# Patient Record
Sex: MALE | Race: WHITE | URBAN - METROPOLITAN AREA
[De-identification: names, ages, dates, MRNs, and addresses within clinical notes are randomized per-mention and may not be internally consistent; named-entity substitution may affect disease eponyms.]

---

## 2017-07-10 ENCOUNTER — THERAPY VISIT (OUTPATIENT)
Dept: PHYSICAL THERAPY | Facility: CLINIC | Age: 57
End: 2017-07-10
Payer: COMMERCIAL

## 2017-07-10 DIAGNOSIS — G89.29 CHRONIC BILATERAL LOW BACK PAIN WITH LEFT-SIDED SCIATICA: Primary | Chronic | ICD-10-CM

## 2017-07-10 DIAGNOSIS — M54.42 CHRONIC BILATERAL LOW BACK PAIN WITH LEFT-SIDED SCIATICA: Primary | Chronic | ICD-10-CM

## 2017-07-10 DIAGNOSIS — M53.3 SI (SACROILIAC) JOINT DYSFUNCTION: ICD-10-CM

## 2017-07-10 PROCEDURE — 97112 NEUROMUSCULAR REEDUCATION: CPT | Mod: GP | Performed by: PHYSICAL THERAPIST

## 2017-07-10 PROCEDURE — 97161 PT EVAL LOW COMPLEX 20 MIN: CPT | Mod: GP | Performed by: PHYSICAL THERAPIST

## 2017-07-10 PROCEDURE — 97140 MANUAL THERAPY 1/> REGIONS: CPT | Mod: GP | Performed by: PHYSICAL THERAPIST

## 2017-07-10 NOTE — MR AVS SNAPSHOT
"              After Visit Summary   7/10/2017    Jim Lopez    MRN: [id number]           Patient Information     Date Of Birth          1960        Visit Information        Provider Department      7/10/2017 5:50 PM Tyler Wilkinson PT [facility]        Today's Diagnoses     Chronic bilateral low back pain with left-sided sciatica    -  1    SI (sacroiliac) joint dysfunction           Follow-ups after your visit        Who to contact     If you have questions or need follow up information about today's clinic visit or your schedule please contact [facility] [employer] [facility] directly at [phone].  Normal or non-critical lab and imaging results will be communicated to you by [employer], letter or phone within 4 business days after the clinic has received the results. If you do not hear from us within 7 days, please contact the clinic through [employer] or phone. If you have a critical or abnormal lab result, we will notify you by phone as soon as possible.  Submit refill requests through [employer] or call your pharmacy and they will forward the refill request to us. Please allow 3 business days for your refill to be completed.          Additional Information About Your Visit        MyChart Information     [employer] lets you send messages to your doctor, view your test results, renew your prescriptions, schedule appointments and more. To sign up, go to [email or web address] . Click on \"Log in\" on the left side of the screen, which will take you to the Welcome page. Then click on \"Sign up Now\" on the right side of the page.     You will be asked to enter the access code listed below, as well as some personal information. Please follow the directions to create your username and password.     Your access code is: [email or web address]  Expires: 10/8/2017  6:28 PM     Your access code will  in 90 days. If you need help or a new code, please " call your [facility] clinic or [phone].        Care EveryWhere ID     This is your Care EveryWhere ID. This could be used by other organizations to access your [facility] medical records  [id number]         Blood Pressure from Last 3 Encounters:   No data found for BP    Weight from Last 3 Encounters:   No data found for Wt              We Performed the Following     HC PT EVAL, LOW COMPLEXITY     BRIANA INITIAL EVAL REPORT     MANUAL THER TECH,1+REGIONS,EA 15 MIN     NEUROMUSCULAR RE-EDUCATION        Primary Care Provider    None       No address on file        Equal Access to Services     [employer] : Hadii aad ku hadasho Soomaali, waaxda luqadaha, qaybta kaalmada adeegyada, waxay idiin hayaan adeeg dylan rutledge . So [facility] [phone].    ATENCIÓN: Si habla español, tiene a hernandez disposición servicios gratuitos de asistencia lingüística. Llame al [phone].    We comply with applicable federal civil rights laws and [place] laws. We do not discriminate on the basis of race, color, national origin, age, disability sex, sexual orientation or gender identity.            Thank you!     Thank you for choosing [facility]  for your care. Our goal is always to provide you with excellent care. Hearing back from our patients is one way we can continue to improve our services. Please take a few minutes to complete the written survey that you may receive in the mail after your visit with us. Thank you!             Your Updated Medication List - Protect others around you: Learn how to safely use, store and throw away your medicines at www.disposemymeds.org.      Notice  As of 7/10/2017  6:28 PM    You have not been prescribed any medications.

## 2017-07-10 NOTE — LETTER
[facility] PHYSICAL THERAPY  [address]  [facility] [id number] [phone]    2017    Re: Jim Lopez   :   1960  MRN:  [id number]   REFERRING PHYSICIAN:   Juan Francisco Branham    [facility]IC [facility]  Date of Initial Evaluation:  7/10/17  Visits:  Rxs Used: 1  Reason for Referral:     Chronic bilateral low back pain with left-sided sciatica  SI (sacroiliac) joint dysfunction    EVALUATION SUMMARY    [facility] Initial Evaluation    Subjective:    Patient is a 57 year old male presenting with rehab back hpi. The history is provided by the patient. No  was used.   Jim Lopez is a 57 year old male with a lumbar condition.  Condition occurred with:  Insidious onset.  Condition occurred: for unknown reasons.  This is a chronic and recurrent condition  Pt presents to PT with primary complain of low back pain, chronic in nature, has had issues on and off for the past 20 years. States he usually can control the pain with exercises. This time the pain will not go away. He gets pain down the LLE laterally to the foot, tingling and occasionally numbness. He now is also getting some pain in the L iliac crest and the SI joint. The current flare up has been preset for one annita in duration, unknown cause. Pt presents with referral dated 7-3-17 for PT evaluate and treat. .    Patient reports pain:  Lower lumbar spine and SI joint left.  Radiates to:  Gluteals left, thigh left, knee left, lower leg left and foot left.  Pain is described as aching (Tingling and numbness) and is intermittent and reported as 3/10 and 4/10.  Associated symptoms:  Tingling, numbness and loss of motion/stiffness. Pain is the same all the time (Varies day to day worse evenings ).  Symptoms are exacerbated by sitting Relieved by: Doing some movement that cracks the back, inversion, press ups   Since onset  "symptoms are gradually improving.    Previous treatment includes physical therapy (2006).    General health as reported by patient is fair.  Pertinent medical history includes:  High blood pressure, diabetes and thyroid problems.  Medical allergies: no.  Other surgeries include:  Orthopedic surgery (R foot ).  Current medications:  High blood pressure medication and thyroid medication.  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  Primary job tasks include:  Prolonged sitting, prolonged standing, lifting and repetitive tasks.    Barriers include:  None as reported by the patient.  Red flags:  None as reported by the patient.                      Objective:       Lumbar/SI Evaluation  ROM:    AROM Lumbar:   Flexion:          Distal 1/3 of tibia, tight HS, no pain   Ext:                    10 degrees, no pain    Side Bend:        Left:  3\" above knee, no pain     Right:  Knee joint line, no pain   Rotation:           Left:     Right:   Side Glide:        Left:     Right:     Lumbar Myotomes:  normal    Lumbar DTR's:  normal    Lumbar Dermtomes:    T12 Left:  Normal-light touch     T12 Right:  Normal-light touch  L1 Left:  Normal-light touch     L1 Right:  Normal-light touch  L2 Left:  Normal-light touch     L2 Right:  Normal-light touch  L3 Left:  Normal-light touch     L3 Right:  Normal-light touch  L4 Left:  Hypo-light touch  L4 Right:  Normal-light touch  L5 Left:  Hypo-light touch     L5 Right:  Normal-light touch  S1 Left:  Normal-light touch     S1 Right:  Normal-light touch    Neural Tension/Mobility:    Left side:SLR or Slump  negative.   Right side:   Slump or SLR  negative.     Lumbar Palpation:    Tenderness present at Left:    Piriformis    Spinal Segmental Conclusions:   Level: Hypo noted at L1, L2, L3, L4 and L5  Level:  FRS left noted at L4 and L5    SI joint/Sacrum:    Low L iliac crest, low L PSIS, deep R sacral sulcus, (+) giletts test L, (+) standing " flexion test R side. (+) clavicular jump test for up slip on the R side, (+) sacral flexion test for L on R torsion  Sacral conclusion left:  Sacral torsion  Sacral conclusion right:  Upslip        Assessment/Plan:      Patient is a 57 year old male with lumbar and sacral complaints.    Patient has the following significant findings with corresponding treatment plan.                Diagnosis 1:  Low back pain, SI joint dysfunction   Pain -  hot/cold therapy, US, electric stimulation, manual therapy, self management, education, directional preference exercise and home program  Decreased ROM/flexibility - manual therapy, therapeutic exercise and home program  Decreased joint mobility - manual therapy, therapeutic exercise and home program  Inflammation - cold therapy, electric stimulation and self management/home program  Impaired muscle performance - neuro re-education and home program  Decreased function - therapeutic activities and home program  Impaired posture - neuro re-education and home program    Therapy Evaluation Codes:   1) History comprised of:   Personal factors that impact the plan of care:      None.    Comorbidity factors that impact the plan of care are:      None.     Medications impacting care: None.  2) Examination of Body Systems comprised of:   Body structures and functions that impact the plan of care:      Lumbar spine and Sacral illiac joint.   Activity limitations that impact the plan of care are:      Sitting.  3) Clinical presentation characteristics are:   Stable/Uncomplicated.  4) Decision-Making    Low complexity using standardized patient assessment instrument and/or measureable assessment of functional outcome.  Cumulative Therapy Evaluation is: Low complexity.    Previous and current functional limitations:  (See Goal Flow Sheet for this information)    Short term and Long term goals: (See Goal Flow Sheet for this information)     Communication ability:  Patient appears to be able to  clearly communicate and understand verbal and written communication and follow directions correctly.  Treatment Explanation - The following has been discussed with the patient:   RX ordered/plan of care  Anticipated outcomes  Possible risks and side effects  This patient would benefit from PT intervention to resume normal activities.   Rehab potential is good.    Frequency:  2 X week, once daily  Duration:  for 2 weeks tapering to 1 X a week over 6 weeks  Discharge Plan:  Achieve all LTG.  Independent in home treatment program.  Reach maximal therapeutic benefit.        Thank you for your referral.    INQUIRIES  Therapist: Tyler Wilkinson   [facility]  [address] [place] [address]. . [address]  [facility] [address]  Phone: [phone]  Fax: [phone]

## 2017-07-10 NOTE — PROGRESS NOTES
[facility] Initial Evaluation      Subjective:    Patient is a 57 year old male presenting with rehab back hpi. The history is provided by the patient. No  was used.   Jim Lopez is a 57 year old male with a lumbar condition.  Condition occurred with:  Insidious onset.  Condition occurred: for unknown reasons.  This is a chronic and recurrent condition  Pt presents to PT with primary complain of low back pain, chronic in nature, has had issues on and off for the past 20 years. States he usually can control the pain with exercises. This time the pain will not go away. He gets pain down the LLE laterally to the foot, tingling and occasionally numbness. He now is also getting some pain in the L iliac crest and the SI joint. The current flare up has been preset for one annita in duration, unknown cause. Pt presents with referral dated 7-3-17 for PT evaluate and treat. .    Patient reports pain:  Lower lumbar spine and SI joint left.  Radiates to:  Gluteals left, thigh left, knee left, lower leg left and foot left.  Pain is described as aching (Tingling and numbness) and is intermittent and reported as 3/10 and 4/10.  Associated symptoms:  Tingling, numbness and loss of motion/stiffness. Pain is the same all the time (Varies day to day worse evenings ).  Symptoms are exacerbated by sitting Relieved by: Doing some movement that cracks the back, inversion, press ups   Since onset symptoms are gradually improving.    Previous treatment includes physical therapy (2006).    General health as reported by patient is fair.  Pertinent medical history includes:  High blood pressure, diabetes and thyroid problems.  Medical allergies: no.  Other surgeries include:  Orthopedic surgery (R foot ).  Current medications:  High blood pressure medication and thyroid medication.  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  Primary job tasks include:  " Prolonged sitting, prolonged standing, lifting and repetitive tasks.    Barriers include:  None as reported by the patient.    Red flags:  None as reported by the patient.                        Objective:    System         Lumbar/SI Evaluation  ROM:    AROM Lumbar:   Flexion:          Distal 1/3 of tibia, tight HS, no pain   Ext:                    10 degrees, no pain    Side Bend:        Left:  3\" above knee, no pain     Right:  Knee joint line, no pain   Rotation:           Left:     Right:   Side Glide:        Left:     Right:           Lumbar Myotomes:  normal            Lumbar DTR's:  normal        Lumbar Dermtomes:    T12 Left:  Normal-light touch     T12 Right:  Normal-light touch  L1 Left:  Normal-light touch     L1 Right:  Normal-light touch  L2 Left:  Normal-light touch     L2 Right:  Normal-light touch  L3 Left:  Normal-light touch     L3 Right:  Normal-light touch  L4 Left:  Hypo-light touch       L4 Right:  Normal-light touch  L5 Left:  Hypo-light touch     L5 Right:  Normal-light touch  S1 Left:  Normal-light touch     S1 Right:  Normal-light touch  Neural Tension/Mobility:      Left side:SLR or Slump  negative.     Right side:   Slump or SLR  negative.   Lumbar Palpation:    Tenderness present at Left:    Piriformis        Spinal Segmental Conclusions:     Level: Hypo noted at L1, L2, L3, L4 and L5  Level:  FRS left noted at L4 and L5    SI joint/Sacrum:    Low L iliac crest, low L PSIS, deep R sacral sulcus, (+) giletts test L, (+) standing flexion test R side. (+) clavicular jump test for up slip on the R side, (+) sacral flexion test for L on R torsion        Sacral conclusion left:  Sacral torsion  Sacral conclusion right:  Upslip                                             General     ROS    Assessment/Plan:      Patient is a 57 year old male with lumbar and sacral complaints.    Patient has the following significant findings with corresponding treatment plan.                Diagnosis 1:  Low " back pain, SI joint dysfunction   Pain -  hot/cold therapy, US, electric stimulation, manual therapy, self management, education, directional preference exercise and home program  Decreased ROM/flexibility - manual therapy, therapeutic exercise and home program  Decreased joint mobility - manual therapy, therapeutic exercise and home program  Inflammation - cold therapy, electric stimulation and self management/home program  Impaired muscle performance - neuro re-education and home program  Decreased function - therapeutic activities and home program  Impaired posture - neuro re-education and home program    Therapy Evaluation Codes:   1) History comprised of:   Personal factors that impact the plan of care:      None.    Comorbidity factors that impact the plan of care are:      None.     Medications impacting care: None.  2) Examination of Body Systems comprised of:   Body structures and functions that impact the plan of care:      Lumbar spine and Sacral illiac joint.   Activity limitations that impact the plan of care are:      Sitting.  3) Clinical presentation characteristics are:   Stable/Uncomplicated.  4) Decision-Making    Low complexity using standardized patient assessment instrument and/or measureable assessment of functional outcome.  Cumulative Therapy Evaluation is: Low complexity.    Previous and current functional limitations:  (See Goal Flow Sheet for this information)    Short term and Long term goals: (See Goal Flow Sheet for this information)     Communication ability:  Patient appears to be able to clearly communicate and understand verbal and written communication and follow directions correctly.  Treatment Explanation - The following has been discussed with the patient:   RX ordered/plan of care  Anticipated outcomes  Possible risks and side effects  This patient would benefit from PT intervention to resume normal activities.   Rehab potential is good.    Frequency:  2 X week, once  daily  Duration:  for 2 weeks tapering to 1 X a week over 6 weeks  Discharge Plan:  Achieve all LTG.  Independent in home treatment program.  Reach maximal therapeutic benefit.    Please refer to the daily flowsheet for treatment today, total treatment time and time spent performing 1:1 timed codes.

## 2017-07-24 ENCOUNTER — THERAPY VISIT (OUTPATIENT)
Dept: PHYSICAL THERAPY | Facility: CLINIC | Age: 57
End: 2017-07-24
Payer: COMMERCIAL

## 2017-07-24 DIAGNOSIS — G89.29 CHRONIC BILATERAL LOW BACK PAIN WITH LEFT-SIDED SCIATICA: ICD-10-CM

## 2017-07-24 DIAGNOSIS — M53.3 SI (SACROILIAC) JOINT DYSFUNCTION: ICD-10-CM

## 2017-07-24 DIAGNOSIS — M54.42 CHRONIC BILATERAL LOW BACK PAIN WITH LEFT-SIDED SCIATICA: ICD-10-CM

## 2017-07-24 PROCEDURE — 97110 THERAPEUTIC EXERCISES: CPT | Mod: GP | Performed by: PHYSICAL THERAPIST

## 2017-07-24 PROCEDURE — 97140 MANUAL THERAPY 1/> REGIONS: CPT | Mod: GP | Performed by: PHYSICAL THERAPIST

## 2017-07-31 ENCOUNTER — THERAPY VISIT (OUTPATIENT)
Dept: PHYSICAL THERAPY | Facility: CLINIC | Age: 57
End: 2017-07-31
Payer: COMMERCIAL

## 2017-07-31 DIAGNOSIS — M54.42 CHRONIC BILATERAL LOW BACK PAIN WITH LEFT-SIDED SCIATICA: ICD-10-CM

## 2017-07-31 DIAGNOSIS — G89.29 CHRONIC BILATERAL LOW BACK PAIN WITH LEFT-SIDED SCIATICA: ICD-10-CM

## 2017-07-31 DIAGNOSIS — M53.3 SI (SACROILIAC) JOINT DYSFUNCTION: ICD-10-CM

## 2017-07-31 PROCEDURE — 97140 MANUAL THERAPY 1/> REGIONS: CPT | Mod: GP | Performed by: PHYSICAL THERAPIST

## 2017-07-31 PROCEDURE — 97110 THERAPEUTIC EXERCISES: CPT | Mod: GP | Performed by: PHYSICAL THERAPIST

## 2017-08-07 ENCOUNTER — THERAPY VISIT (OUTPATIENT)
Dept: PHYSICAL THERAPY | Facility: CLINIC | Age: 57
End: 2017-08-07
Payer: COMMERCIAL

## 2017-08-07 DIAGNOSIS — G89.29 CHRONIC BILATERAL LOW BACK PAIN WITH LEFT-SIDED SCIATICA: ICD-10-CM

## 2017-08-07 DIAGNOSIS — M54.42 CHRONIC BILATERAL LOW BACK PAIN WITH LEFT-SIDED SCIATICA: ICD-10-CM

## 2017-08-07 DIAGNOSIS — M53.3 SI (SACROILIAC) JOINT DYSFUNCTION: ICD-10-CM

## 2017-08-07 PROCEDURE — 97110 THERAPEUTIC EXERCISES: CPT | Mod: GP | Performed by: PHYSICAL THERAPIST

## 2017-08-07 PROCEDURE — 97140 MANUAL THERAPY 1/> REGIONS: CPT | Mod: GP | Performed by: PHYSICAL THERAPIST

## 2017-08-07 NOTE — MR AVS SNAPSHOT
"              After Visit Summary   8/7/2017    Jim Lopez    MRN: [id number]           Patient Information     Date Of Birth          1960        Visit Information        Provider Department      8/7/2017 4:30 PM Tyler Wilkinson PT [facility] Physical Therapy        Today's Diagnoses     Chronic bilateral low back pain with left-sided sciatica        SI (sacroiliac) joint dysfunction           Follow-ups after your visit        Your next 10 appointments already scheduled     Aug 14, 2017  4:30 PM CDT   BRIANA Spine with Tyler Wilkinson PT   [facility] Physical Therapy ([facility]  )    [address]  [facility] [address] [phone]              Who to contact     If you have questions or need follow up information about today's clinic visit or your schedule please contact [facility] PHYSICAL [facility] directly at [phone].  Normal or non-critical lab and imaging results will be communicated to you by [employer], letter or phone within 4 business days after the clinic has received the results. If you do not hear from us within 7 days, please contact the clinic through [employer] or phone. If you have a critical or abnormal lab result, we will notify you by phone as soon as possible.  Submit refill requests through [employer] or call your pharmacy and they will forward the refill request to us. Please allow 3 business days for your refill to be completed.          Additional Information About Your Visit        [employer] Information     [employer] lets you send messages to your doctor, view your test results, renew your prescriptions, schedule appointments and more. To sign up, go to [email or web address] . Click on \"Log in\" on the left side of the screen, which will take you to the Welcome page. Then click on \"Sign up Now\" on the right side of the page.     You will be asked to enter the access code " listed below, as well as some personal information. Please follow the directions to create your username and password.     Your access code is: [email or web address]  Expires: 10/8/2017  6:28 PM     Your access code will  in 90 days. If you need help or a new code, please call your [facility] clinic or [phone].        Care EveryWhere ID     This is your Care EveryWhere ID. This could be used by other organizations to access your [facility] medical records  [id number]         Blood Pressure from Last 3 Encounters:   No data found for BP    Weight from Last 3 Encounters:   No data found for Wt              We Performed the Following     MANUAL THER TECH,1+REGIONS,EA 15 MIN     THERAPEUTIC EXERCISES        Primary Care Provider    None       No address on file        Equal Access to Services     [employer] : Hadii gennaro Mckenzie, waaxda luqadaha, qaybta kaalmada adealberyajavier, shahrzad rutledge . So [facility] [phone].    ATENCIÓN: Si habla español, tiene a hernandez disposición servicios gratuitos de asistencia lingüística. Llame al [phone].    We comply with applicable federal civil rights laws and [place] laws. We do not discriminate on the basis of race, color, national origin, age, disability sex, sexual orientation or gender identity.            Thank you!     Thank you for choosing [facility] PH[facility]ICAL THERAP[facility]  for your care. Our goal is always to provide you with excellent care. Hearing back from our patients is one way we can continue to improve our services. Please take a few minutes to complete the written survey that you may receive in the mail after your visit with us. Thank you!             Your Updated Medication List - Protect others around you: Learn how to safely use, store and throw away your medicines at www.disposemymeds.org.      Notice  As of 2017  5:07 PM    You have not been prescribed any medications.

## 2017-11-01 PROBLEM — M53.3 SI (SACROILIAC) JOINT DYSFUNCTION: Status: RESOLVED | Noted: 2017-07-10 | Resolved: 2017-11-01

## 2017-11-01 PROBLEM — M54.42 CHRONIC BILATERAL LOW BACK PAIN WITH LEFT-SIDED SCIATICA: Status: RESOLVED | Noted: 2017-07-10 | Resolved: 2017-11-01

## 2017-11-01 PROBLEM — G89.29 CHRONIC BILATERAL LOW BACK PAIN WITH LEFT-SIDED SCIATICA: Status: RESOLVED | Noted: 2017-07-10 | Resolved: 2017-11-01
